# Patient Record
Sex: MALE | Race: OTHER | HISPANIC OR LATINO | URBAN - METROPOLITAN AREA
[De-identification: names, ages, dates, MRNs, and addresses within clinical notes are randomized per-mention and may not be internally consistent; named-entity substitution may affect disease eponyms.]

---

## 2022-04-01 ENCOUNTER — EMERGENCY (EMERGENCY)
Facility: HOSPITAL | Age: 37
LOS: 1 days | Discharge: HOME | End: 2022-04-01
Attending: EMERGENCY MEDICINE
Payer: MEDICAID

## 2022-04-01 VITALS
HEIGHT: 69 IN | SYSTOLIC BLOOD PRESSURE: 135 MMHG | WEIGHT: 223.99 LBS | RESPIRATION RATE: 18 BRPM | TEMPERATURE: 99 F | OXYGEN SATURATION: 98 % | HEART RATE: 92 BPM | DIASTOLIC BLOOD PRESSURE: 91 MMHG

## 2022-04-01 DIAGNOSIS — Z98.84 BARIATRIC SURGERY STATUS: Chronic | ICD-10-CM

## 2022-04-01 PROCEDURE — 99285 EMERGENCY DEPT VISIT HI MDM: CPT

## 2022-04-01 RX ORDER — SODIUM CHLORIDE 9 MG/ML
1000 INJECTION INTRAMUSCULAR; INTRAVENOUS; SUBCUTANEOUS ONCE
Refills: 0 | Status: COMPLETED | OUTPATIENT
Start: 2022-04-01 | End: 2022-04-01

## 2022-04-01 NOTE — ED PROVIDER NOTE - PATIENT PORTAL LINK FT
You can access the FollowMyHealth Patient Portal offered by [facility] by registering at the following website: [email or web address]. By joining [facility]’s FollowMyHealth portal, you will also be able to view your health information using other applications (apps) compatible with our system.

## 2022-04-01 NOTE — ED PROVIDER NOTE - PHYSICAL EXAMINATION
Vital signs noted, see flowsheet.  General: NAD, well appearing and non-toxic.  HEENT: NC/AT. MMM. Conjunctiva and sclera clear b/l.  EOMI. PERRL.  Neck: Soft and supple  Cardiac: RRR. +S1/S2. Peripheral pulses 2+ and symmetric b/l. No LE edema.  Respiratory: Speaking in full sentences, no evidence of respiratory distress. Lungs CTA b/l, no wheezes/rhonchi/rales/stridor.   Abdomen: BSx4. Soft, NTND. No guarding or rebound tenderness. No suprapubic tenderness.  Back: Spine midline and non-tender. No CVAT.  Skin: Abdominal incisions c/d/i, no signs of infection   Lymph: No cervical lymphadenopathy  Neuro: Awake, alert and oriented to person/place/time/situation. Moves all extremities spontaneously and symmetrically.  No focal deficit  Psych: Normal affect

## 2022-04-01 NOTE — ED PROVIDER NOTE - NS ED ATTENDING STATEMENT MOD
This was a shared visit with the SHERMAN. I reviewed and verified the documentation and independently performed the documented:

## 2022-04-01 NOTE — ED PROVIDER NOTE - OBJECTIVE STATEMENT
37 y/o M with PMHX HTN, s/p gastric bypass presents to ED c/o 4-5 episodes of non bloody diarrhea for past 7 days. Pt reports he had gastric bypass 10 days ago at [facility]. Pt called his Surgeon who recommended cutting out dairy from his diet but diarrhea has persisted. Pt has been following liquid diet. 35 y/o M with PMHX HTN, s/p gastric bypass presents to ED c/o 4-5 episodes of non bloody diarrhea for past 7 days. Pt reports he had gastric bypass 10 days ago at [facility]. Pt called his Surgeon who recommended cutting out dairy from his diet but diarrhea has persisted. Pt has been following liquid diet. Pt denies abdominal discomfort at this time.

## 2022-04-01 NOTE — ED ADULT NURSE NOTE - OBJECTIVE STATEMENT
Pt. c/o diarrhea x 7 days, denies N/V/fever/pain.  Pt. had gastric bypass 10 days ago, on liquid diet.  all other systems wnl.

## 2022-04-01 NOTE — ED PROVIDER NOTE - ATTENDING CONTRIBUTION TO CARE
Persistent diarrhea since gastric sleeve but no abdominal pain and benign exam, CT with findings possibly compatible with colitis. No leukocytosis or fever. Nonbloody diarrhea. Volume depletion corrected. Needs close follow up with his bariatric team. Return precautions provided.

## 2022-04-01 NOTE — ED PROVIDER NOTE - CLINICAL SUMMARY MEDICAL DECISION MAKING FREE TEXT BOX
37 y/o M with PMHX HTN, s/p gastric bypass presents to ED c/o 4-5 episodes of non bloody diarrhea for past 7 days. Pt reports he had gastric bypass 10 days ago at [facility]  - Will check labs, CT, give fluids

## 2022-04-01 NOTE — ED PROVIDER NOTE - NSFOLLOWUPINSTRUCTIONS_ED_ALL_ED_FT
- Amelie un seguimiento con robertson médico de atención primaria en 1 o 2 días. Si no puede hacer un seguimiento con robertson médico de atención primaria, regrese al servicio de urgencias por cualquier problema urgente.  - Busque atención médica inmediata ante cualquier signo o síntoma nuevo, que empeore o que le preocupe.  - [place] los medicamentos según las indicaciones, asegúrese de leer todas las instrucciones en el empaque  - Se le entregaron copias de todos los resultados de dandy pruebas, llévelas a robertson médico de atención primaria para que revise los resultados anormales  - Seguimiento con     - Si tiene dificultades para realizar un seguimiento, llame al: [phone] ([id number]) o visite [email or web address] para obtener un médico o especialista de [facility] que acepte robertson seguro en robertson área.    ¡Sentirse mejor!      Diarrea en los adultos    Diarrhea, Adult      La diarrea consiste en deposiciones frecuentes, blandas o acuosas. La diarrea puede hacerlo sentir débil y deshidratarlo. La deshidratación puede causarle cansancio, sed, sequedad en la boca y disminución en la frecuencia con la que orina.    Generalmente, la diarrea dura entre 2 y 3 días. Sin embargo, puede durar más tiempo si se trata de un signo de algo más cady. Es importante tratar la diarrea nisa se lo haya indicado el médico.      Siga estas indicaciones en robertson casa:      Comida y bebida                   Siga estas recomendaciones nisa se lo haya indicado el médico:  •[place] shantal solución de rehidratación oral (oral rehydration solution, ORS). Es un medicamento de venta shireen que ayuda a que el cuerpo recupere el equilibrio normal de nutrientes y agua. Se la encuentra en farmacias y tiendas minoristas.      •Savi abundantes líquidos, nisa agua, trocitos de hielo, jugos de fruta rebajados con agua y bebidas deportivas bajas en calorías. Puede beber leche también, si lo desea.      •Evite consumir líquidos que contengan mucha azúcar o cafeína, nisa bebidas energéticas, bebidas deportivas y refrescos.      •En la medida en que pueda, consuma alimentos blandos y fáciles de digerir en pequeñas cantidades. Estos alimentos incluyen bananas, compota de manzana, arroz, owen magras, tostadas y galletas saladas.      •Evite joel alcohol.      •Evite los alimentos condimentados o con alto contenido de grasa.      Medicamentos     •[place] los medicamentos de venta shireen y los recetados solamente nisa se lo haya indicado el médico.      •Si le recetaron un antibiótico, tómelo nisa se lo haya indicado el médico. No deje de usar el antibiótico aunque comience a sentirse mejor.        Indicaciones generales    •Lávese las vj frecuentemente usando agua y jabón. Use desinfectante para vj si no dispone de agua y jabón. Las demás personas de la casa deben lavarse las vj también. Las vj deben lavarse:  •Después de usar el baño o cambiar un pañal.       •Antes de preparar, cocinar o servir la comida.       •Mientras cuida de shantal persona enferma, o cuando visita a alguien en el hospital.         •Savi suficiente líquido nisa para mantener la orina de color amarillo pálido.      •Descanse en robertson casa mientras se recupera.      •Controle robertson afección para detectar cualquier cambio.      •[place] un baño caliente para ayudar a disminuir el ardor o el dolor causados por los episodios frecuentes de diarrea.      •Concurra a todas las visitas de control nisa se lo haya indicado el médico. [place] es importante.        Comuníquese con un médico si:    •Tiene fiebre.      •La diarrea empeora.      •Aparecen nuevos síntomas.      •No puede retener los líquidos.      •Se siente aturdido o mareado.      •Tiene dolor de yas.      •Tiene calambres musculares.        Solicite ayuda inmediatamente si:    •Siente dolor en el pecho.      •Se siente muy débil o se desmaya.      •Tiene heces con alok, de color charisma, o con aspecto alquitranado.      •Tiene dolor intenso, cólicos o distensión abdominal.      •Tiene problemas para respirar o respira muy rápidamente.      •Robertson corazón late muy rápidamente.      •Siente la piel fría y húmeda.      •Se siente confundido.    •Tiene signos de deshidratación, nisa los siguientes:  •Orina de color oscuro, muy escasa o falta de orina.      •Labios agrietados.      •Sequedad de boca.      •Ojos hundidos.      •Somnolencia.      •Debilidad.          Resumen    •La diarrea consiste en deposiciones frecuentes, blandas o acuosas. La diarrea puede hacerlo sentir débil y deshidratarlo.      •Savi suficiente líquido para mantener la orina de color amarillo pálido.      •Asegúrese de lavarse las vj después de usar el baño. Use desinfectante para vj si no dispone de agua y jabón.      •Comuníquese con un médico si la diarrea empeora o tiene nuevos síntomas.      •Busque ayuda de inmediato si tiene signos de deshidratación.      Esta información no tiene nisa fin reemplazar el consejo del médico. Asegúrese de hacerle al médico cualquier pregunta que tenga. - Amelie un seguimiento con robertson médico de atención primaria en 1 o 2 días. Si no puede hacer un seguimiento con robertson médico de atención primaria, regrese al servicio de urgencias por cualquier problema urgente.  - Busque atención médica inmediata ante cualquier signo o síntoma nuevo, que empeore o que le preocupe.  - Se le entregaron copias de todos los resultados de dandy pruebas, llévelas a robertson médico de atención primaria para que revise los resultados anormales  - llame a robertson cirujano HOY para programar un seguimiento     - Si tiene dificultades para realizar un seguimiento, llame al: [phone] ([id number]) o visite [email or web address] para obtener un médico o especialista de [facility] que acepte robertson seguro en robertson área.    ¡Sentirse mejor!      Diarrea en los adultos    Diarrhea, Adult      La diarrea consiste en deposiciones frecuentes, blandas o acuosas. La diarrea puede hacerlo sentir débil y deshidratarlo. La deshidratación puede causarle cansancio, sed, sequedad en la boca y disminución en la frecuencia con la que orina.    Generalmente, la diarrea dura entre 2 y 3 días. Sin embargo, puede durar más tiempo si se trata de un signo de algo más cady. Es importante tratar la diarrea nisa se lo haya indicado el médico.      Siga estas indicaciones en robertson casa:      Comida y bebida                   Siga estas recomendaciones nisa se lo haya indicado el médico:  •[place] shantal solución de rehidratación oral (oral rehydration solution, ORS). Es un medicamento de venta shireen que ayuda a que el cuerpo recupere el equilibrio normal de nutrientes y agua. Se la encuentra en farmacias y tiendas minoristas.      •Savi abundantes líquidos, nisa agua, trocitos de hielo, jugos de fruta rebajados con agua y bebidas deportivas bajas en calorías. Puede beber leche también, si lo desea.      •Evite consumir líquidos que contengan mucha azúcar o cafeína, nisa bebidas energéticas, bebidas deportivas y refrescos.      •En la medida en que pueda, consuma alimentos blandos y fáciles de digerir en pequeñas cantidades. Estos alimentos incluyen bananas, compota de manzana, arroz, owen magras, tostadas y galletas saladas.      •Evite joel alcohol.      •Evite los alimentos condimentados o con alto contenido de grasa.      Medicamentos     •[place] los medicamentos de venta shireen y los recetados solamente nisa se lo haya indicado el médico.      •Si le recetaron un antibiótico, tómelo nisa se lo haya indicado el médico. No deje de usar el antibiótico aunque comience a sentirse mejor.        Indicaciones generales    •Lávese las vj frecuentemente usando agua y jabón. Use desinfectante para vj si no dispone de agua y jabón. Las demás personas de la casa deben lavarse las vj también. Las vj deben lavarse:  •Después de usar el baño o cambiar un pañal.       •Antes de preparar, cocinar o servir la comida.       •Mientras cuida de shantal persona enferma, o cuando visita a alguien en el hospital.         •Savi suficiente líquido nisa para mantener la orina de color amarillo pálido.      •Descanse en robertson casa mientras se recupera.      •Controle robertson afección para detectar cualquier cambio.      •[place] un baño caliente para ayudar a disminuir el ardor o el dolor causados por los episodios frecuentes de diarrea.      •Concurra a todas las visitas de control nisa se lo haya indicado el médico. [place] es importante.        Comuníquese con un médico si:    •Tiene fiebre.      •La diarrea empeora.      •Aparecen nuevos síntomas.      •No puede retener los líquidos.      •Se siente aturdido o mareado.      •Tiene dolor de yas.      •Tiene calambres musculares.        Solicite ayuda inmediatamente si:    •Siente dolor en el pecho.      •Se siente muy débil o se desmaya.      •Tiene heces con alok, de color charisma, o con aspecto alquitranado.      •Tiene dolor intenso, cólicos o distensión abdominal.      •Tiene problemas para respirar o respira muy rápidamente.      •[place] corazón late muy rápidamente.      •Siente la piel fría y húmeda.      •Se siente confundido.    •Tiene signos de deshidratación, nisa los siguientes:  •Orina de color oscuro, muy escasa o falta de orina.      •Labios agrietados.      •Sequedad de boca.      •Ojos hundidos.      •Somnolencia.      •Debilidad.          Resumen    •La diarrea consiste en deposiciones frecuentes, blandas o acuosas. La diarrea puede hacerlo sentir débil y deshidratarlo.      •Savi suficiente líquido para mantener la orina de color amarillo pálido.      •Asegúrese de lavarse las vj después de usar el baño. Use desinfectante para vj si no dispone de agua y jabón.      •Comuníquese con un médico si la diarrea empeora o tiene nuevos síntomas.      •Busque ayuda de inmediato si tiene signos de deshidratación.      Esta información no tiene nisa fin reemplazar el consejo del médico. Asegúrese de hacerle al médico cualquier pregunta que tenga.

## 2022-04-01 NOTE — ED PROVIDER NOTE - PROGRESS NOTE DETAILS
AQUIELS Harris NOTE: Labs reviewed, pt reassessed, informed of plan for CT scan  ED  [place] AQUILES Harris NOTE: Reviewed all results and plan; pt still denying abdominal pain. Advised to call surgeon in AM. Abd soft NTND no rebound or guarding. Pt stable for d/c, reports improvement, VSS, tolerating PO, ambulatory.  Discussion includes results, plan, and return precautions. Pt advised to f/u with PMD 1-2 days and specialists discussed.  Printed copies of available lab/radiology results contained within discharge packet. Pt verbalized understanding/agreement of plan. Language Line Mason #[id number]

## 2022-04-02 VITALS
TEMPERATURE: 98 F | OXYGEN SATURATION: 99 % | SYSTOLIC BLOOD PRESSURE: 134 MMHG | RESPIRATION RATE: 16 BRPM | HEART RATE: 85 BPM | DIASTOLIC BLOOD PRESSURE: 83 MMHG

## 2022-04-02 LAB
ALBUMIN SERPL ELPH-MCNC: 4.5 G/DL — SIGNIFICANT CHANGE UP (ref 3.3–5.2)
ALP SERPL-CCNC: 78 U/L — SIGNIFICANT CHANGE UP (ref 40–120)
ALT FLD-CCNC: 104 U/L — HIGH
ANION GAP SERPL CALC-SCNC: 21 MMOL/L — HIGH (ref 5–17)
AST SERPL-CCNC: 65 U/L — HIGH
BASOPHILS # BLD AUTO: 0.06 K/UL — SIGNIFICANT CHANGE UP (ref 0–0.2)
BASOPHILS NFR BLD AUTO: 0.7 % — SIGNIFICANT CHANGE UP (ref 0–2)
BILIRUB SERPL-MCNC: 0.5 MG/DL — SIGNIFICANT CHANGE UP (ref 0.4–2)
BUN SERPL-MCNC: 21.7 MG/DL — HIGH (ref 8–20)
CALCIUM SERPL-MCNC: 9.7 MG/DL — SIGNIFICANT CHANGE UP (ref 8.6–10.2)
CHLORIDE SERPL-SCNC: 91 MMOL/L — LOW (ref 98–107)
CO2 SERPL-SCNC: 20 MMOL/L — LOW (ref 22–29)
CREAT SERPL-MCNC: 0.98 MG/DL — SIGNIFICANT CHANGE UP (ref 0.5–1.3)
EGFR: 104 ML/MIN/1.73M2 — SIGNIFICANT CHANGE UP
EOSINOPHIL # BLD AUTO: 0.11 K/UL — SIGNIFICANT CHANGE UP (ref 0–0.5)
EOSINOPHIL NFR BLD AUTO: 1.2 % — SIGNIFICANT CHANGE UP (ref 0–6)
GLUCOSE SERPL-MCNC: 78 MG/DL — SIGNIFICANT CHANGE UP (ref 70–99)
HCT VFR BLD CALC: 43.4 % — SIGNIFICANT CHANGE UP (ref 39–50)
HGB BLD-MCNC: 14.9 G/DL — SIGNIFICANT CHANGE UP (ref 13–17)
IMM GRANULOCYTES NFR BLD AUTO: 0.2 % — SIGNIFICANT CHANGE UP (ref 0–1.5)
LIDOCAIN IGE QN: 78 U/L — HIGH (ref 22–51)
LYMPHOCYTES # BLD AUTO: 3.3 K/UL — SIGNIFICANT CHANGE UP (ref 1–3.3)
LYMPHOCYTES # BLD AUTO: 36.9 % — SIGNIFICANT CHANGE UP (ref 13–44)
MCHC RBC-ENTMCNC: 29.4 PG — SIGNIFICANT CHANGE UP (ref 27–34)
MCHC RBC-ENTMCNC: 34.3 GM/DL — SIGNIFICANT CHANGE UP (ref 32–36)
MCV RBC AUTO: 85.6 FL — SIGNIFICANT CHANGE UP (ref 80–100)
MONOCYTES # BLD AUTO: 0.85 K/UL — SIGNIFICANT CHANGE UP (ref 0–0.9)
MONOCYTES NFR BLD AUTO: 9.5 % — SIGNIFICANT CHANGE UP (ref 2–14)
NEUTROPHILS # BLD AUTO: 4.6 K/UL — SIGNIFICANT CHANGE UP (ref 1.8–7.4)
NEUTROPHILS NFR BLD AUTO: 51.5 % — SIGNIFICANT CHANGE UP (ref 43–77)
PLATELET # BLD AUTO: 337 K/UL — SIGNIFICANT CHANGE UP (ref 150–400)
POTASSIUM SERPL-MCNC: 3.9 MMOL/L — SIGNIFICANT CHANGE UP (ref 3.5–5.3)
POTASSIUM SERPL-SCNC: 3.9 MMOL/L — SIGNIFICANT CHANGE UP (ref 3.5–5.3)
PROT SERPL-MCNC: 8.4 G/DL — SIGNIFICANT CHANGE UP (ref 6.6–8.7)
RBC # BLD: 5.07 M/UL — SIGNIFICANT CHANGE UP (ref 4.2–5.8)
RBC # FLD: 12.1 % — SIGNIFICANT CHANGE UP (ref 10.3–14.5)
SODIUM SERPL-SCNC: 132 MMOL/L — LOW (ref 135–145)
WBC # BLD: 8.94 K/UL — SIGNIFICANT CHANGE UP (ref 3.8–10.5)
WBC # FLD AUTO: 8.94 K/UL — SIGNIFICANT CHANGE UP (ref 3.8–10.5)

## 2022-04-02 PROCEDURE — 36415 COLL VENOUS BLD VENIPUNCTURE: CPT

## 2022-04-02 PROCEDURE — 80053 COMPREHEN METABOLIC PANEL: CPT

## 2022-04-02 PROCEDURE — 83735 ASSAY OF MAGNESIUM: CPT

## 2022-04-02 PROCEDURE — 83690 ASSAY OF LIPASE: CPT

## 2022-04-02 PROCEDURE — 74177 CT ABD & PELVIS W/CONTRAST: CPT | Mod: MA

## 2022-04-02 PROCEDURE — 85025 COMPLETE CBC W/AUTO DIFF WBC: CPT

## 2022-04-02 PROCEDURE — 84100 ASSAY OF PHOSPHORUS: CPT

## 2022-04-02 PROCEDURE — 74177 CT ABD & PELVIS W/CONTRAST: CPT | Mod: 26,MA

## 2022-04-02 PROCEDURE — 99284 EMERGENCY DEPT VISIT MOD MDM: CPT | Mod: 25

## 2022-04-02 RX ORDER — SODIUM CHLORIDE 9 MG/ML
1000 INJECTION, SOLUTION INTRAVENOUS ONCE
Refills: 0 | Status: COMPLETED | OUTPATIENT
Start: 2022-04-02 | End: 2022-04-02

## 2022-04-02 RX ORDER — IOHEXOL 300 MG/ML
30 INJECTION, SOLUTION INTRAVENOUS ONCE
Refills: 0 | Status: COMPLETED | OUTPATIENT
Start: 2022-04-02 | End: 2022-04-02

## 2022-04-02 RX ADMIN — SODIUM CHLORIDE 500 MILLILITER(S): 9 INJECTION, SOLUTION INTRAVENOUS at 01:42

## 2022-04-02 RX ADMIN — IOHEXOL 30 MILLILITER(S): 300 INJECTION, SOLUTION INTRAVENOUS at 00:45

## 2022-04-02 RX ADMIN — SODIUM CHLORIDE 1000 MILLILITER(S): 9 INJECTION INTRAMUSCULAR; INTRAVENOUS; SUBCUTANEOUS at 00:12

## 2022-04-21 ENCOUNTER — EMERGENCY (EMERGENCY)
Facility: HOSPITAL | Age: 37
LOS: 1 days | Discharge: HOME | End: 2022-04-21
Attending: EMERGENCY MEDICINE
Payer: MEDICAID

## 2022-04-21 VITALS
OXYGEN SATURATION: 96 % | DIASTOLIC BLOOD PRESSURE: 76 MMHG | RESPIRATION RATE: 18 BRPM | WEIGHT: 216.05 LBS | SYSTOLIC BLOOD PRESSURE: 114 MMHG | TEMPERATURE: 99 F | HEIGHT: 69 IN | HEART RATE: 88 BPM

## 2022-04-21 VITALS
TEMPERATURE: 98 F | DIASTOLIC BLOOD PRESSURE: 69 MMHG | RESPIRATION RATE: 18 BRPM | OXYGEN SATURATION: 98 % | HEART RATE: 81 BPM | SYSTOLIC BLOOD PRESSURE: 117 MMHG

## 2022-04-21 DIAGNOSIS — Z98.84 BARIATRIC SURGERY STATUS: Chronic | ICD-10-CM

## 2022-04-21 PROBLEM — I10 ESSENTIAL (PRIMARY) HYPERTENSION: Chronic | Status: ACTIVE | Noted: 2022-04-01

## 2022-04-21 LAB
ALBUMIN SERPL ELPH-MCNC: 4.1 G/DL — SIGNIFICANT CHANGE UP (ref 3.3–5.2)
ALP SERPL-CCNC: 84 U/L — SIGNIFICANT CHANGE UP (ref 40–120)
ALT FLD-CCNC: 99 U/L — HIGH
ANION GAP SERPL CALC-SCNC: 13 MMOL/L — SIGNIFICANT CHANGE UP (ref 5–17)
AST SERPL-CCNC: 51 U/L — HIGH
BASOPHILS # BLD AUTO: 0.06 K/UL — SIGNIFICANT CHANGE UP (ref 0–0.2)
BASOPHILS NFR BLD AUTO: 0.9 % — SIGNIFICANT CHANGE UP (ref 0–2)
BILIRUB SERPL-MCNC: 0.5 MG/DL — SIGNIFICANT CHANGE UP (ref 0.4–2)
BUN SERPL-MCNC: 13.5 MG/DL — SIGNIFICANT CHANGE UP (ref 8–20)
CALCIUM SERPL-MCNC: 9.4 MG/DL — SIGNIFICANT CHANGE UP (ref 8.6–10.2)
CHLORIDE SERPL-SCNC: 101 MMOL/L — SIGNIFICANT CHANGE UP (ref 98–107)
CO2 SERPL-SCNC: 25 MMOL/L — SIGNIFICANT CHANGE UP (ref 22–29)
CREAT SERPL-MCNC: 0.74 MG/DL — SIGNIFICANT CHANGE UP (ref 0.5–1.3)
EGFR: 122 ML/MIN/1.73M2 — SIGNIFICANT CHANGE UP
EOSINOPHIL # BLD AUTO: 0.49 K/UL — SIGNIFICANT CHANGE UP (ref 0–0.5)
EOSINOPHIL NFR BLD AUTO: 7.2 % — HIGH (ref 0–6)
GLUCOSE SERPL-MCNC: 88 MG/DL — SIGNIFICANT CHANGE UP (ref 70–99)
HCT VFR BLD CALC: 38.9 % — LOW (ref 39–50)
HGB BLD-MCNC: 12.9 G/DL — LOW (ref 13–17)
IMM GRANULOCYTES NFR BLD AUTO: 0.3 % — SIGNIFICANT CHANGE UP (ref 0–1.5)
LIDOCAIN IGE QN: 69 U/L — HIGH (ref 22–51)
LYMPHOCYTES # BLD AUTO: 2.12 K/UL — SIGNIFICANT CHANGE UP (ref 1–3.3)
LYMPHOCYTES # BLD AUTO: 31.3 % — SIGNIFICANT CHANGE UP (ref 13–44)
MAGNESIUM SERPL-MCNC: 1.9 MG/DL — SIGNIFICANT CHANGE UP (ref 1.6–2.6)
MCHC RBC-ENTMCNC: 28.8 PG — SIGNIFICANT CHANGE UP (ref 27–34)
MCHC RBC-ENTMCNC: 33.2 GM/DL — SIGNIFICANT CHANGE UP (ref 32–36)
MCV RBC AUTO: 86.8 FL — SIGNIFICANT CHANGE UP (ref 80–100)
MONOCYTES # BLD AUTO: 0.64 K/UL — SIGNIFICANT CHANGE UP (ref 0–0.9)
MONOCYTES NFR BLD AUTO: 9.5 % — SIGNIFICANT CHANGE UP (ref 2–14)
NEUTROPHILS # BLD AUTO: 3.44 K/UL — SIGNIFICANT CHANGE UP (ref 1.8–7.4)
NEUTROPHILS NFR BLD AUTO: 50.8 % — SIGNIFICANT CHANGE UP (ref 43–77)
NT-PROBNP SERPL-SCNC: 84 PG/ML — SIGNIFICANT CHANGE UP (ref 0–300)
PLATELET # BLD AUTO: 213 K/UL — SIGNIFICANT CHANGE UP (ref 150–400)
POTASSIUM SERPL-MCNC: 3.9 MMOL/L — SIGNIFICANT CHANGE UP (ref 3.5–5.3)
POTASSIUM SERPL-SCNC: 3.9 MMOL/L — SIGNIFICANT CHANGE UP (ref 3.5–5.3)
PROT SERPL-MCNC: 7.5 G/DL — SIGNIFICANT CHANGE UP (ref 6.6–8.7)
RAPID RVP RESULT: SIGNIFICANT CHANGE UP
RBC # BLD: 4.48 M/UL — SIGNIFICANT CHANGE UP (ref 4.2–5.8)
RBC # FLD: 12.6 % — SIGNIFICANT CHANGE UP (ref 10.3–14.5)
SARS-COV-2 RNA SPEC QL NAA+PROBE: SIGNIFICANT CHANGE UP
SODIUM SERPL-SCNC: 139 MMOL/L — SIGNIFICANT CHANGE UP (ref 135–145)
TROPONIN T SERPL-MCNC: <0.01 NG/ML — SIGNIFICANT CHANGE UP (ref 0–0.06)
TROPONIN T SERPL-MCNC: <0.01 NG/ML — SIGNIFICANT CHANGE UP (ref 0–0.06)
WBC # BLD: 6.77 K/UL — SIGNIFICANT CHANGE UP (ref 3.8–10.5)
WBC # FLD AUTO: 6.77 K/UL — SIGNIFICANT CHANGE UP (ref 3.8–10.5)

## 2022-04-21 PROCEDURE — 0225U NFCT DS DNA&RNA 21 SARSCOV2: CPT

## 2022-04-21 PROCEDURE — 99285 EMERGENCY DEPT VISIT HI MDM: CPT | Mod: 25

## 2022-04-21 PROCEDURE — 93010 ELECTROCARDIOGRAM REPORT: CPT

## 2022-04-21 PROCEDURE — 99285 EMERGENCY DEPT VISIT HI MDM: CPT

## 2022-04-21 PROCEDURE — 71045 X-RAY EXAM CHEST 1 VIEW: CPT | Mod: 26

## 2022-04-21 PROCEDURE — 99236 HOSP IP/OBS SAME DATE HI 85: CPT

## 2022-04-21 PROCEDURE — 85025 COMPLETE CBC W/AUTO DIFF WBC: CPT

## 2022-04-21 PROCEDURE — G0378: CPT

## 2022-04-21 PROCEDURE — 71045 X-RAY EXAM CHEST 1 VIEW: CPT

## 2022-04-21 PROCEDURE — 75574 CT ANGIO HRT W/3D IMAGE: CPT | Mod: MA

## 2022-04-21 PROCEDURE — 93005 ELECTROCARDIOGRAM TRACING: CPT

## 2022-04-21 PROCEDURE — 83880 ASSAY OF NATRIURETIC PEPTIDE: CPT

## 2022-04-21 PROCEDURE — 36415 COLL VENOUS BLD VENIPUNCTURE: CPT

## 2022-04-21 PROCEDURE — 84484 ASSAY OF TROPONIN QUANT: CPT

## 2022-04-21 PROCEDURE — 75574 CT ANGIO HRT W/3D IMAGE: CPT | Mod: 26,MA

## 2022-04-21 PROCEDURE — 83690 ASSAY OF LIPASE: CPT

## 2022-04-21 PROCEDURE — 80053 COMPREHEN METABOLIC PANEL: CPT

## 2022-04-21 PROCEDURE — 93306 TTE W/DOPPLER COMPLETE: CPT

## 2022-04-21 PROCEDURE — 83735 ASSAY OF MAGNESIUM: CPT

## 2022-04-21 RX ORDER — METOPROLOL TARTRATE 50 MG
100 TABLET ORAL ONCE
Refills: 0 | Status: COMPLETED | OUTPATIENT
Start: 2022-04-21 | End: 2022-04-21

## 2022-04-21 RX ORDER — NEBIVOLOL HYDROCHLORIDE 5 MG/1
5 TABLET ORAL DAILY
Refills: 0 | Status: DISCONTINUED | OUTPATIENT
Start: 2022-04-21 | End: 2022-04-25

## 2022-04-21 RX ORDER — ASPIRIN/CALCIUM CARB/MAGNESIUM 324 MG
324 TABLET ORAL ONCE
Refills: 0 | Status: COMPLETED | OUTPATIENT
Start: 2022-04-21 | End: 2022-04-21

## 2022-04-21 RX ORDER — HYDROCHLOROTHIAZIDE 25 MG
12.5 TABLET ORAL DAILY
Refills: 0 | Status: DISCONTINUED | OUTPATIENT
Start: 2022-04-21 | End: 2022-04-21

## 2022-04-21 RX ORDER — LISINOPRIL 2.5 MG/1
10 TABLET ORAL DAILY
Refills: 0 | Status: DISCONTINUED | OUTPATIENT
Start: 2022-04-21 | End: 2022-04-25

## 2022-04-21 RX ADMIN — Medication 100 MILLIGRAM(S): at 13:33

## 2022-04-21 RX ADMIN — Medication 324 MILLIGRAM(S): at 11:53

## 2022-04-21 NOTE — ED CDU PROVIDER INITIAL DAY NOTE - OBJECTIVE STATEMENT
Pt is a 36y M with PMh HTN, gastric bypass surgery at [facility] last month, HTN, HLD presenting with CP. Patient states he developed 5/10 dull constant L sided CP this morning at 6 am lasting 15 minutes; Currently asymptomatic. Denies SOB, dizziness, lightheadedness, syncope, radiation down his arms or up his jaw, previous cardiac history. Patient's mother passed away from an MI at age 62. Patient last saw a cardiologist 3 years ago. He has had congestion and sneezing for 5 days. Denies any stents in the past. Denies fevers, chills, dizziness, lightheadedness, dysphagia, dysarthria, diplopia, photophobia, syncope, cough, SOB, abdominal pain, neck pain, back pain, diarrhea, dysuria, hematuria, hematochezia, hematemesis, n/v, recent travel, sick contacts, leg swelling.

## 2022-04-21 NOTE — CONSULT NOTE ADULT - ASSESSMENT
37 y/o M with PMH of HTN, Dyslipidemia and Gastric bypass surgery (1 month ago) presents with complaints of acute onset of chest pain and abnormal EKG.    1) Chest pain with abnormal EKG   - patient notes alleviation of chest pain at rest  - EKG reviewed and notes to have inferior lateral twave inversions no baseline to compare   - Trop x 1 negative and recommend to trend   - will refer for TTE to assess LV function and valvulopathy   - will refer for Cardiac CTA in the AM to assess coronary anatomy and any coronary obstruction   - obtain Lipid panel Hgb A1C and TSH     2) HTN   - blood pressure controlled   - PMD recommended to stop HCTz and continue with Lisinopril and Nebivolol   - continue with blood pressure monitoring      Laine Tran D.O. [facility]  Cardiology/Vascular Cardiology -[facility]   Telephone # [phone]

## 2022-04-21 NOTE — ED PROVIDER NOTE - PHYSICAL EXAMINATION
Constitutional: Well appearing, awake, alert, oriented to person, place, and time/situation and in no apparent distress  ENMT: Airway patent nasal mucosa clear. Mouth with normal mucosa. Throat has no vesicles no oropharyngeal exudates and uvula is midline. No blood in the oropharynx  EYES: clear bilaterally, pupils equal, round and reactive to light  Cardiac: Regular rate, regular rhythm. Heart sounds S1, S2. No murmurs, rubs or gallops. Good capillary refill, 2+ pulses, no peripheral edema, chest wall non-tender  Respiratory: Lungs CTAB, no use of accessory muscles, no crackles, satting 99% on RA in no distress  Gastrointestinal: Abdomen nondistended, non-tender, no rebound guarding or peritoneal signs  Genitourinary: No CVA tenderness, pelvis nontender, bladder nondistended  Musculoskeletal: Spine appears normal, range of motion is not limited, no muscle or joint tenderness  Neurological: Alert and oriented, no focal deficits, no motor or sensory deficits. CN 2-12 intact, PERRLA, EOMI, No FND, moving all extremities equally, sensation intact, No dysmetria, no ataxia, negative heel-shin, 5/5 strength   Skin: Skin normal color for race, warm, dry and intact, No evidence of rash

## 2022-04-21 NOTE — CONSULT NOTE ADULT - SUBJECTIVE AND OBJECTIVE BOX
[facility] [facility]                                                               Office:  [address] [address]                                                              Telephone: [phone]. Fax:[phone]                                                                        CARDIOLOGY CONSULTATION NOTE                                                                                             Consult requested by:  Chest Pain and abnromal EKG   Reason for Consultation: Dr. Lozoya   History obtained by: Patient and medical record   obtained: No    Chief complaint:    Patient is a 36y old  Male who presents with a chief complaint of       HPI: 35 y/o M with PMH of HTN, Dyslipidemia and Gastric bypass surgery (1 month ago) presents with complaints of acute onset of chest pain and abnormal EKG. Patient notes that he was usual health until this morning had acute onset of chest pain left sided sharp pain, which was 7-8 /10 lasting for 15 minutes and then went away on its own no recurrence, some shortness of breath and no palpitations lightheadedness or syncope. He notes prior to bariatric surgery no cardiovascular work up and had EKG told it was normal and was cleared from that standpoint.   Patient also has been having low bp and recently PMD stopped HCTZ and to continue with Nebivolol and Lisinopril         REVIEW OF SYMPTOMS:     CONSTITUTIONAL: No fever, weight loss, or fatigue  ENMT:  No difficulty hearing, tinnitus, vertigo; No sinus or throat pain  NECK: No pain or stiffness  CARDIOVASCULAR: +chest pain, dyspnea, syncope, palpitations, dizziness, Orthopnea, Paroxsymal nocturnal dyspnea  RESPIRATORY: No Dyspnea on exertion, Shortness of breath, cough, wheezing  : No dysuria, no hematuria   GI: No dark color stool, no melena, no diarrhea, no constipation, no abdominal pain   NEURO: No headache, no dizziness, no slurred speech   MUSCULOSKELETAL: No joint pain or swelling; No muscle, back, or extremity pain  PSYCH: No agitation, no anxiety.    ALL OTHER REVIEW OF SYSTEMS ARE NEGATIVE.      PREVIOUS DIAGNOSTIC TESTING  ECHO FINDINGS:      STRESS FINDINGS:      CATHETERIZATION FINDINGS:         ALLERGIES: Allergies    No Known Allergies    Intolerances          PAST MEDICAL HISTORY  HTN (hypertension)        PAST SURGICAL HISTORY  S/P gastric bypass        FAMILY HISTORY:  FH: myocardial infarction (Mother)        SOCIAL HISTORY:  Denies smoking/alcohol/drugs  CIGARETTES:     ALCOHOL:  DRUGS:      CURRENT MEDICATIONS:  hydrochlorothiazide 12.5 milliGRAM(s) Oral daily  lisinopril 10 milliGRAM(s) Oral daily  nebivolol 5 milliGRAM(s) Oral daily           HOME MEDICATIONS:      Vital Signs Last 24 Hrs  T(C): 36.6 (21 Apr 2022 15:55), Max: 37.2 (21 Apr 2022 09:32)  T(F): 97.9 (21 Apr 2022 15:55), Max: 98.9 (21 Apr 2022 09:32)  HR: 81 (21 Apr 2022 15:55) (81 - 88)  BP: 117/69 (21 Apr 2022 15:55) (114/76 - 120/68)  BP(mean): --  RR: 18 (21 Apr 2022 15:55) (18 - 18)  SpO2: 98% (21 Apr 2022 15:55) (96% - 98%)      PHYSICAL EXAM:  Constitutional: Comfortable . No acute distress.   HEENT: Atraumatic and normocephalic , neck is supple . no JVD. No carotid bruit. PEERL   CNS: A&Ox3. No focal deficits. EOMI. Cranial nerves II-IX are intact.   Lymph Nodes: Cervical : Not palpable.  Respiratory: CTAB  Cardiovascular: S1S2 RRR. No murmur/rubs or gallop.  Gastrointestinal: Soft non-tender and non distended . +Bowel sounds. negative Salgado's sign.  Extremities: No edema.   Psychiatric: Calm . no agitation.  Skin: No skin rash/ulcers visualized to face, hands or feet.    Intake and output:     LABS:                        12.9   6.77  )-----------( 213      ( 21 Apr 2022 10:54 )             38.9     04-21    139  |  101  |  13.5  ----------------------------<  88  3.9   |  25.0  |  0.74    Ca    9.4      21 Apr 2022 10:54  Mg     1.9     04-21    TPro  7.5  /  Alb  4.1  /  TBili  0.5  /  DBili  x   /  AST  51<H>  /  ALT  99<H>  /  AlkPhos  84  04-21    CARDIAC MARKERS ( 21 Apr 2022 10:54 )  x     / <0.01 ng/mL / x     / x     / x        ;p-BNP=Serum Pro-Brain Natriuretic Peptide: 84 pg/mL (04-21 @ 10:54)          INTERPRETATION OF TELEMETRY: Reviewed by me. sinus rhythm   ECG: Reviewed by me. NSR with inferior lateral T wave inversions     RADIOLOGY & ADDITIONAL STUDIES:    X-ray:  reviewed by me.   CT scan:   MRI:

## 2022-04-21 NOTE — ED PROVIDER NOTE - ATTENDING CONTRIBUTION TO CARE
Dr. Chao : I have personally seen and examined this patient at the bedside. I have fully participated in the care of this patient. I have reviewed all pertinent clinical information, including history, physical exam, plan and the Resident's note and agree except as noted.     35yo M with PMhx HTN, gastric bypass surgery at [facility] last month, HTN, HLD pw cp since this morning around 6am.  non radiating no numbness/tingling. Patient's mother passed away from an MI at age 62.     Denies f/c/n/v/cp/sob/palpitations/cough/abd.pain/d/c/dysuria/hematuria. sick contacts/recent travel.    PE:  head; atraumatic normocephalic  eyes: perrla  Heart: rrr s1s2  lungs: ctab  abd: soft, nt nd + bs no rebound/guarding no cva ttp  le: no swelling no calf ttp  back: no midline cervical/thoracic/lumbar ttp      -->new t wave inversions on ekg will check trop ct coronary cardiology consult--obs

## 2022-04-21 NOTE — ED ADULT NURSE NOTE - SKIN TEMPERATURE MOISTURE
2nd Attempted to call report to receiving [facility] RN.  No answer from RN's Phone warm

## 2022-04-21 NOTE — ED CDU PROVIDER DISPOSITION NOTE - CLINICAL COURSE
Pt is a 36y M with PMH HTN, gastric bypass surgery at [facility] last month, HTN, HLD presenting with CP. Patient states he developed 5/10 dull constant L sided CP this morning at 6 am lasting 15 minutes; Currently asymptomatic. Denies SOB, dizziness, lightheadedness, syncope, radiation down his arms or up his jaw, previous cardiac history. Patient's mother passed away from an MI at age 62. Patient last saw a cardiologist 3 years ago. He has had congestion and sneezing for 5 days. Denies any stents in the past. Denies fevers, chills, dizziness, lightheadedness, dysphagia, dysarthria, diplopia, photophobia, syncope, cough, SOB, abdominal pain, neck pain, back pain, diarrhea, dysuria, hematuria, hematochezia, hematemesis, n/v, recent travel, sick contacts, leg swelling. Pt was seen by cardiology and recommends CCTA and ECHO. Results reviewed with cardiology and cleared. Will dc with f/u and return precautions.

## 2022-04-21 NOTE — ED CDU PROVIDER INITIAL DAY NOTE - MEDICAL DECISION MAKING DETAILS
Patient is a 36y M with HTN, gastric bypass surgery at [facility] last month, HTN, HLD presenting for chest pain this am. Pt found to have new EKG t wave inversions. Cardiology consulted and placed in obs for further work up.

## 2022-04-21 NOTE — ED CDU PROVIDER DISPOSITION NOTE - NSFOLLOWUPCLINICS_GEN_ALL_ED_FT
[facility]iology  Cardiology  [address], [address]  [place] [address]  Phone: [phone]  Fax:

## 2022-04-21 NOTE — ED PROVIDER NOTE - OBJECTIVE STATEMENT
37yo M hx of htn ran out of meds x 2 weeks pw cp/sob Patient is a 35 yo male with PMhx HTN, gastric bypass surgery at [facility] last month, HTN, HLD presenting with CP. Patient states he developed 5/10 dull constant epigastric CP this morning at 6 am lasting 15 minutes; Currently asymptomatic. Denies SOB, dizziness, lightheadedness, syncope, radiation down his arms or up his jaw, previous cardiac history. Patient's mother passed away from an MI at age 62. Patient last saw a cardiologist 3 years ago. He has had congestion and sneezing for 5 days. Denies any stents in the past. Denies fevers, chills, dizziness, lightheadedness, dysphagia, dysarthria, diplopia, photophobia, syncope, cough, SOB, abdominal pain, neck pain, back pain, diarrhea, dysuria, hematuria, hematochezia, hematemesis, n/v, recent travel, sick contacts, leg swelling.

## 2022-04-21 NOTE — ED CDU PROVIDER DISPOSITION NOTE - ATTENDING APP SHARED VISIT CONTRIBUTION OF CARE
I agree with the PA's note and was available for any issues/concerns. I was directly involved in patient care. My brief overall assessment is as follows: cp, w/u as noted, return precautions.

## 2022-04-21 NOTE — ED CDU PROVIDER DISPOSITION NOTE - NSFOLLOWUPINSTRUCTIONS_ED_ALL_ED_FT
Follow up with cardiology.  Come back with new or worsening symptoms.  Chest Pain    Chest pain can be caused by many different conditions which may or may not be dangerous. Causes include heartburn, lung infections, heart attack, blood clot in lungs, skin infections, strain or damage to muscle, cartilage, or bones, etc. In addition to a history and physical examination, an electrocardiogram (ECG) or other lab tests may have been performed to determine the cause of your chest pain. Follow up with your primary care provider or with a cardiologist as instructed.     SEEK IMMEDIATE MEDICAL CARE IF YOU HAVE ANY OF THE FOLLOWING SYMPTOMS: worsening chest pain, coughing up blood, unexplained back/neck/jaw pain, severe abdominal pain, dizziness or lightheadedness, fainting, shortness of breath, sweaty or clammy skin, vomiting, or racing heart beat. These symptoms may represent a serious problem that is an emergency. Do not wait to see if the symptoms will go away. Get medical help right away. Call 911 and do not drive yourself to the hospital.

## 2022-04-21 NOTE — ED PROVIDER NOTE - CLINICAL SUMMARY MEDICAL DECISION MAKING FREE TEXT BOX
Patient is a 35 yo male with PMhx HTN, gastric bypass surgery at [facility] last month, HTN, HLD presenting with CP. Patient states he developed 5/10 dull constant epigastric CP this morning at 6 am lasting 15 minutes; Currently asymptomatic. Denies SOB, dizziness, lightheadedness, syncope, radiation down his arms or up his jaw, previous cardiac history. Patient's mother passed away from an MI at age 62. Patient last saw a cardiologist 3 years ago. VSS, resting comfortably, lungs CTAB, 2+ equal peripheral pulses, no peripheral edema. ASA given. ECG showing new T wave inversions in the inferior leads. Troponin to r/o ACS. cbc, cmp mg, lipase to r/o electrolyte abnormalities. CT angio cardiac to r/o CAD; CXR, BNP RVP to r/o HF vs. PNA vs. URI. Cardiology consulted. Will continue to monitor.

## 2022-04-21 NOTE — ED CDU PROVIDER INITIAL DAY NOTE - NSICDXFAMILYHX_GEN_ALL_CORE_FT
FAMILY HISTORY:  Mother  Still living? Unknown  FH: myocardial infarction, Age at diagnosis: Age Unknown

## 2023-06-22 ENCOUNTER — APPOINTMENT (OUTPATIENT)
Dept: FAMILY MEDICINE | Facility: CLINIC | Age: 38
End: 2023-06-22
Payer: MEDICAID

## 2023-06-22 VITALS
HEIGHT: 69 IN | WEIGHT: 199 LBS | HEART RATE: 72 BPM | SYSTOLIC BLOOD PRESSURE: 155 MMHG | RESPIRATION RATE: 16 BRPM | DIASTOLIC BLOOD PRESSURE: 95 MMHG | BODY MASS INDEX: 29.47 KG/M2

## 2023-06-22 DIAGNOSIS — Z11.4 ENCOUNTER FOR SCREENING FOR HUMAN IMMUNODEFICIENCY VIRUS [HIV]: ICD-10-CM

## 2023-06-22 DIAGNOSIS — Z82.49 FAMILY HISTORY OF ISCHEMIC HEART DISEASE AND OTHER DISEASES OF THE CIRCULATORY SYSTEM: ICD-10-CM

## 2023-06-22 DIAGNOSIS — R22.42 LOCALIZED SWELLING, MASS AND LUMP, LEFT LOWER LIMB: ICD-10-CM

## 2023-06-22 DIAGNOSIS — Z83.3 FAMILY HISTORY OF DIABETES MELLITUS: ICD-10-CM

## 2023-06-22 DIAGNOSIS — Z23 ENCOUNTER FOR IMMUNIZATION: ICD-10-CM

## 2023-06-22 DIAGNOSIS — Z00.00 ENCOUNTER FOR GENERAL ADULT MEDICAL EXAMINATION W/OUT ABNORMAL FINDINGS: ICD-10-CM

## 2023-06-22 DIAGNOSIS — Z11.59 ENCOUNTER FOR SCREENING FOR OTHER VIRAL DISEASES: ICD-10-CM

## 2023-06-22 PROCEDURE — 99203 OFFICE O/P NEW LOW 30 MIN: CPT | Mod: 25

## 2023-06-22 PROCEDURE — 99385 PREV VISIT NEW AGE 18-39: CPT | Mod: 25

## 2023-06-22 RX ORDER — CETIRIZINE HYDROCHLORIDE 10 MG/1
10 TABLET, COATED ORAL
Refills: 0 | Status: ACTIVE | COMMUNITY

## 2023-06-22 NOTE — HEALTH RISK ASSESSMENT
[Yes] : Yes [2 - 4 times a month (2 pts)] : 2-4 times a month (2 points) [5 or 6 (2 pts)] : 5 or 6 (2  points) [Less than monthly (1 pt)] : Less than monthly (1 point) [No] : In the past 12 months have you used drugs other than those required for medical reasons? No [0] : 2) Feeling down, depressed, or hopeless: Not at all (0) [PHQ-2 Negative - No further assessment needed] : PHQ-2 Negative - No further assessment needed [HIV Test offered] : HIV Test offered [Hepatitis C test offered] : Hepatitis C test offered [Never] : Never [Audit-CScore] : 5 [[id number]] : 0

## 2023-06-22 NOTE — PHYSICAL EXAM
[No Acute Distress] : no acute distress [Well Nourished] : well nourished [Well Developed] : well developed [Well-Appearing] : well-appearing [Normal Voice/Communication] : normal voice/communication [Normal Sclera/Conjunctiva] : normal sclera/conjunctiva [PERRL] : pupils equal round and reactive to light [EOMI] : extraocular movements intact [Normal Outer Ear/Nose] : the outer ears and nose were normal in appearance [Normal Oropharynx] : the oropharynx was normal [Normal TMs] : both tympanic membranes were normal [Normal Nasal Mucosa] : the nasal mucosa was normal [No JVD] : no jugular venous distention [No Lymphadenopathy] : no lymphadenopathy [Supple] : supple [Thyroid Normal, No Nodules] : the thyroid was normal and there were no nodules present [No Respiratory Distress] : no respiratory distress  [No Accessory Muscle Use] : no accessory muscle use [Clear to Auscultation] : lungs were clear to auscultation bilaterally [Normal Rate] : normal rate  [Regular Rhythm] : with a regular rhythm [Normal S1, S2] : normal S1 and S2 [No Murmur] : no murmur heard [No Carotid Bruits] : no carotid bruits [No Abdominal Bruit] : a ~M bruit was not heard ~T in the abdomen [No Varicosities] : no varicosities [Pedal Pulses Present] : the pedal pulses are present [No Edema] : there was no peripheral edema [No Palpable Aorta] : no palpable aorta [No Extremity Clubbing/Cyanosis] : no extremity clubbing/cyanosis [Soft] : abdomen soft [Non Tender] : non-tender [Non-distended] : non-distended [No Masses] : no abdominal mass palpated [No HSM] : no HSM [Normal Bowel Sounds] : normal bowel sounds [Normal Posterior Cervical Nodes] : no posterior cervical lymphadenopathy [Normal Anterior Cervical Nodes] : no anterior cervical lymphadenopathy [No CVA Tenderness] : no CVA  tenderness [No Spinal Tenderness] : no spinal tenderness [No Joint Swelling] : no joint swelling [Grossly Normal Strength/Tone] : grossly normal strength/tone [No Rash] : no rash [Coordination Grossly Intact] : coordination grossly intact [No Focal Deficits] : no focal deficits [Normal Gait] : normal gait [Deep Tendon Reflexes (DTR)] : deep tendon reflexes were 2+ and symmetric [Speech Grossly Normal] : speech grossly normal [Normal Affect] : the affect was normal [Normal Mood] : the mood was normal [Normal Insight/Judgement] : insight and judgment were intact [de-identified] : left popliteal fossae with 2x3 cm subcutaneous lump. no other lesions, drainage noted. minimal tenderness

## 2023-06-22 NOTE — HISTORY OF PRESENT ILLNESS
[FreeTextEntry1] : Establish care [de-identified] : Mr. REBECA ARROYO is a pleasant 37 year old male with PMH of HTN, HLD who comes in to the office to establish care and for his AWE. He needs refills on his lisinopril. He did not take it today. Denies any complaints. Reports that his blood pressures at home are consistently < 140/90\par Reports a lump in his left popliteal fossae which has grown minimally, is non painful but can be tender. No fevers, night sweat, weight loss.\par \par Previous PCP: ALEC Langford\par Cardio: ?

## 2023-06-22 NOTE — PLAN
[FreeTextEntry1] : \par In regards annual physical exam: \par Advised to use sunscreen \par Tdap vaccine offered, patient will get it at a later time\par Ordering CBC, CMP, Hep C, HIV as per screening guidelines\par Depression screen: PHQ-2 test done, < 2 as noted above\par AUDIT-C test done, negative as noted above\par Advised to see optometrist once at year and dentist every 6 months\par \par In regards to hypertension\par Patient's blood pressure elevated today\par DIscussed avoid using salt, monitoring his blood pressures at home and bring the log on next visit.\par Renewing his lisinopril 10 mg QD\par \par Obesity s/p bariatric surgery and In regards to hyperlipidemia:\par Lifestyle modifications discussed\par Checking lipid panel\par Continue statins \par  \par Lump of left popliteal fossae\par baker cyst vs lipoma vs other\par check an US\par \par Return to care: within 2 weeks for blood pressure follow up or sooner should the need arise\par Call or return for any questions

## 2023-06-23 LAB
ALBUMIN SERPL ELPH-MCNC: 4.9 G/DL
ALP BLD-CCNC: 101 U/L
ALT SERPL-CCNC: 15 U/L
ANION GAP SERPL CALC-SCNC: 12 MMOL/L
AST SERPL-CCNC: 27 U/L
BILIRUB SERPL-MCNC: 0.8 MG/DL
BUN SERPL-MCNC: 11 MG/DL
CALCIUM SERPL-MCNC: 10.1 MG/DL
CHLORIDE SERPL-SCNC: 101 MMOL/L
CHOLEST SERPL-MCNC: 243 MG/DL
CO2 SERPL-SCNC: 28 MMOL/L
CREAT SERPL-MCNC: 0.8 MG/DL
EGFR: 118 ML/MIN/1.73M2
GLUCOSE SERPL-MCNC: 85 MG/DL
HCV AB SER QL: NONREACTIVE
HCV S/CO RATIO: 0.08 S/CO
HDLC SERPL-MCNC: 76 MG/DL
HIV1+2 AB SPEC QL IA.RAPID: NONREACTIVE
LDLC SERPL CALC-MCNC: 146 MG/DL
NONHDLC SERPL-MCNC: 166 MG/DL
POTASSIUM SERPL-SCNC: 4.3 MMOL/L
PROT SERPL-MCNC: 7.9 G/DL
SODIUM SERPL-SCNC: 140 MMOL/L
TRIGL SERPL-MCNC: 100 MG/DL

## 2023-07-06 ENCOUNTER — APPOINTMENT (OUTPATIENT)
Dept: FAMILY MEDICINE | Facility: CLINIC | Age: 38
End: 2023-07-06

## 2023-10-13 ENCOUNTER — RX RENEWAL (OUTPATIENT)
Age: 38
End: 2023-10-13

## 2023-11-06 ENCOUNTER — RX CHANGE (OUTPATIENT)
Age: 38
End: 2023-11-06

## 2023-11-06 RX ORDER — LISINOPRIL 10 MG/1
10 TABLET ORAL
Qty: 30 | Refills: 0 | Status: DISCONTINUED | COMMUNITY
Start: 2023-10-13 | End: 2023-11-06

## 2023-12-12 ENCOUNTER — RX RENEWAL (OUTPATIENT)
Age: 38
End: 2023-12-12

## 2023-12-29 ENCOUNTER — APPOINTMENT (OUTPATIENT)
Dept: INTERNAL MEDICINE | Facility: CLINIC | Age: 38
End: 2023-12-29
Payer: MEDICAID

## 2023-12-29 VITALS
WEIGHT: 199 LBS | HEART RATE: 86 BPM | DIASTOLIC BLOOD PRESSURE: 81 MMHG | BODY MASS INDEX: 29.47 KG/M2 | HEIGHT: 69 IN | SYSTOLIC BLOOD PRESSURE: 128 MMHG

## 2023-12-29 DIAGNOSIS — E78.5 HYPERLIPIDEMIA, UNSPECIFIED: ICD-10-CM

## 2023-12-29 DIAGNOSIS — I10 ESSENTIAL (PRIMARY) HYPERTENSION: ICD-10-CM

## 2023-12-29 PROCEDURE — 99214 OFFICE O/P EST MOD 30 MIN: CPT | Mod: 25

## 2023-12-29 NOTE — HISTORY OF PRESENT ILLNESS
[FreeTextEntry1] : follow up [de-identified] : Mr. REBECA ARROYO is a 38 year male with a PMH of HTN comes to the office for BP check and follow up of chronic medical conditions. Patient denies fever, cough SOB. No other complaints at this time.

## 2023-12-29 NOTE — PLAN
[FreeTextEntry1] : HLD- lifestyle modifications recommended will retest lipids today  HTN- patient's BP well controlled with current medication. will continue Lisinopril 10 mg PO QD   Prior to appointment and during encounter with patient extensive medical records were reviewed including but not limited to, Hospital records, out patient records, laboratory data and microbiology data In addition extensive time was also spent in reviewing diagnostic studies.   Total encounter total time 30 mins >50% of time spent counseling/coordinating care  Counseling included abnormal lab results, differential diagnoses, treatment options, risks and benefits, lifestyle changes, current condition, medications, and dose adjustments.  The patient was interactive, attentive, asked questions, and verbalized understanding

## 2023-12-29 NOTE — HEALTH RISK ASSESSMENT
[0] : 2) Feeling down, depressed, or hopeless: Not at all (0) [PHQ-2 Negative - No further assessment needed] : PHQ-2 Negative - No further assessment needed [[id number]] : 0 [Never] : Never

## 2024-01-02 LAB
ALBUMIN SERPL ELPH-MCNC: 5 G/DL
ALP BLD-CCNC: 88 U/L
ALT SERPL-CCNC: 17 U/L
ANION GAP SERPL CALC-SCNC: 14 MMOL/L
AST SERPL-CCNC: 24 U/L
BASOPHILS # BLD AUTO: 0.06 K/UL
BASOPHILS NFR BLD AUTO: 0.9 %
BILIRUB SERPL-MCNC: 0.6 MG/DL
BUN SERPL-MCNC: 15 MG/DL
CALCIUM SERPL-MCNC: 10.2 MG/DL
CHLORIDE SERPL-SCNC: 100 MMOL/L
CHOLEST SERPL-MCNC: 251 MG/DL
CO2 SERPL-SCNC: 27 MMOL/L
CREAT SERPL-MCNC: 0.84 MG/DL
EGFR: 116 ML/MIN/1.73M2
EOSINOPHIL # BLD AUTO: 0.17 K/UL
EOSINOPHIL NFR BLD AUTO: 2.6 %
GLUCOSE SERPL-MCNC: 86 MG/DL
HCT VFR BLD CALC: 46.6 %
HDLC SERPL-MCNC: 72 MG/DL
HGB BLD-MCNC: 15.2 G/DL
IMM GRANULOCYTES NFR BLD AUTO: 0.3 %
LDLC SERPL CALC-MCNC: 158 MG/DL
LYMPHOCYTES # BLD AUTO: 2.48 K/UL
LYMPHOCYTES NFR BLD AUTO: 38.2 %
MAN DIFF?: NORMAL
MCHC RBC-ENTMCNC: 29.9 PG
MCHC RBC-ENTMCNC: 32.6 GM/DL
MCV RBC AUTO: 91.6 FL
MONOCYTES # BLD AUTO: 0.43 K/UL
MONOCYTES NFR BLD AUTO: 6.6 %
NEUTROPHILS # BLD AUTO: 3.33 K/UL
NEUTROPHILS NFR BLD AUTO: 51.4 %
NONHDLC SERPL-MCNC: 179 MG/DL
PLATELET # BLD AUTO: 286 K/UL
POTASSIUM SERPL-SCNC: 4.4 MMOL/L
PROT SERPL-MCNC: 8.1 G/DL
RBC # BLD: 5.09 M/UL
RBC # FLD: 12.1 %
SODIUM SERPL-SCNC: 140 MMOL/L
TRIGL SERPL-MCNC: 123 MG/DL
WBC # FLD AUTO: 6.49 K/UL

## 2024-01-02 RX ORDER — ROSUVASTATIN CALCIUM 5 MG/1
5 TABLET, FILM COATED ORAL DAILY
Qty: 90 | Refills: 3 | Status: ACTIVE | COMMUNITY
Start: 2024-01-02 | End: 1900-01-01

## 2024-01-02 RX ORDER — IBUPROFEN 600 MG/1
600 TABLET, FILM COATED ORAL
Qty: 20 | Refills: 0 | Status: COMPLETED | COMMUNITY
Start: 2023-12-21

## 2024-01-02 RX ORDER — AMOXICILLIN 500 MG/1
500 CAPSULE ORAL
Qty: 21 | Refills: 0 | Status: COMPLETED | COMMUNITY
Start: 2023-12-21

## 2024-04-04 ENCOUNTER — RX RENEWAL (OUTPATIENT)
Age: 39
End: 2024-04-04

## 2024-04-18 RX ORDER — LISINOPRIL 10 MG/1
10 TABLET ORAL
Qty: 90 | Refills: 0 | Status: ACTIVE | COMMUNITY
Start: 2023-11-06 | End: 1900-01-01

## 2024-06-24 ENCOUNTER — APPOINTMENT (OUTPATIENT)
Dept: FAMILY MEDICINE | Facility: CLINIC | Age: 39
End: 2024-06-24

## 2024-11-21 ENCOUNTER — APPOINTMENT (OUTPATIENT)
Dept: CARDIOLOGY | Facility: CLINIC | Age: 39
End: 2024-11-21
Payer: COMMERCIAL

## 2024-11-21 ENCOUNTER — NON-APPOINTMENT (OUTPATIENT)
Age: 39
End: 2024-11-21

## 2024-11-21 VITALS
HEIGHT: 69 IN | DIASTOLIC BLOOD PRESSURE: 74 MMHG | BODY MASS INDEX: 28.73 KG/M2 | WEIGHT: 194 LBS | SYSTOLIC BLOOD PRESSURE: 112 MMHG | HEART RATE: 71 BPM

## 2024-11-21 DIAGNOSIS — E78.5 HYPERLIPIDEMIA, UNSPECIFIED: ICD-10-CM

## 2024-11-21 DIAGNOSIS — R94.31 ABNORMAL ELECTROCARDIOGRAM [ECG] [EKG]: ICD-10-CM

## 2024-11-21 DIAGNOSIS — I10 ESSENTIAL (PRIMARY) HYPERTENSION: ICD-10-CM

## 2024-11-21 PROCEDURE — 99214 OFFICE O/P EST MOD 30 MIN: CPT

## 2024-11-21 PROCEDURE — G2211 COMPLEX E/M VISIT ADD ON: CPT | Mod: NC

## 2024-11-21 PROCEDURE — 93000 ELECTROCARDIOGRAM COMPLETE: CPT

## 2024-11-26 ENCOUNTER — APPOINTMENT (OUTPATIENT)
Dept: CARDIOLOGY | Facility: CLINIC | Age: 39
End: 2024-11-26
Payer: COMMERCIAL

## 2024-11-26 PROCEDURE — 93015 CV STRESS TEST SUPVJ I&R: CPT

## 2024-11-26 PROCEDURE — 93306 TTE W/DOPPLER COMPLETE: CPT

## 2024-12-10 ENCOUNTER — APPOINTMENT (OUTPATIENT)
Dept: CARDIOLOGY | Facility: CLINIC | Age: 39
End: 2024-12-10

## 2024-12-19 ENCOUNTER — APPOINTMENT (OUTPATIENT)
Dept: CARDIOLOGY | Facility: CLINIC | Age: 39
End: 2024-12-19
Payer: COMMERCIAL

## 2024-12-19 VITALS
SYSTOLIC BLOOD PRESSURE: 122 MMHG | HEIGHT: 69 IN | BODY MASS INDEX: 29.18 KG/M2 | WEIGHT: 197 LBS | DIASTOLIC BLOOD PRESSURE: 70 MMHG

## 2024-12-19 DIAGNOSIS — R94.31 ABNORMAL ELECTROCARDIOGRAM [ECG] [EKG]: ICD-10-CM

## 2024-12-19 DIAGNOSIS — E78.5 HYPERLIPIDEMIA, UNSPECIFIED: ICD-10-CM

## 2024-12-19 DIAGNOSIS — I10 ESSENTIAL (PRIMARY) HYPERTENSION: ICD-10-CM

## 2024-12-19 PROCEDURE — G2211 COMPLEX E/M VISIT ADD ON: CPT | Mod: NC

## 2024-12-19 PROCEDURE — 99214 OFFICE O/P EST MOD 30 MIN: CPT

## 2024-12-23 NOTE — ED ADULT NURSE NOTE - SUICIDE SCREENING QUESTION 3
Patient: Bassam Narvaez Date: 2024   : 1950    74 year old male      OUTPATIENT WOUND CARE PROGRESS NOTE    Supervising Wound Care / Hyperbaric Medicine Physician: Dr Jayesh Evans  Consulting Provider:  Jayesh Evans MD  Date of Consultation/Last Comprehensive Exam:  24  Referring  Provider:  [place] POWERS    Chief Complaint: Diabetic foot ulcer    Wound/Ulcer Present:    Diabetic lower extremity ulcer:  Rosenberg grade 1 (superficial diabetic ulcer).  (R/O grade 3)  Diabetic foot exam performed?  Yes.     Current Vascular Assessment:  Physical exam.     Current Antibiotic Regimen:  None.     Current Offloading Modality:  None.    Additional Wound Category:  None     Maximum Baseline Ambulatory Status:  Ambulates unassisted    History of Present Illness:  This is a 74 year old male with a past medical history of type 2 diabetes, hyperlipidemia, hypertension, peripheral neuropathy being seen today for diabetic foot ulcer of the right foot.    Patient reports having an episode of cellulitis back in 2023.  Seen in urgent care and received 7-day course of Keflex.  Symptoms resolved.  Had venous duplex ultrasound which is negative for DVT.    Was seen again on  in urgent care.  Cellulitis was improved however noted he had a hemorrhagic callus.  Received another round of antibiotics and referred to wound care for further evaluation.    Interval History 2024  He currently denies systemic symptoms or expanding redness or warmth  Consuming protein through diet  Currently offloading with routine footwear-will be getting fitted for custom shoes/orthotics later today  Patient is a non-smoker  Patient has a history of controlled diabetes  Per RN, wound measuring similar to prior    Denies any allergies to zinc, iodine, shellfish, silver    Current Treatment Regimen:  Dressing:  See therapy plan   Frequency:  See therapy plan   Changed by:  See therapy plan    Review of  Systems:  Pertinent items are noted in HPI (history of present illness).    Past Medical History:   Diagnosis Date    Benign essential hypertension 5/1/2017    Dyslipidemia 5/23/2018    Hypertriglyceridemia     IFG (impaired fasting glucose) 5/1/2017    Malignant neoplasm of prostate  (CMD)     GG 4+3=7  Dr Huynh    Sleep apnea     does not use c-pap    Small bowel perforation  (CMD)     He was kicked by a heifer and needed exploratory lap and needed bowel resection    Type 2 diabetes mellitus without complication, without long-term current use of insulin  (CMD) 6/4/2019     Past Surgical History:   Procedure Laterality Date    Colonoscopy  12/04/2018    , 10 yr    Extracapsular cataract removal w insert io lens prosth wo ecp  04/25/2012         Extracapsular cataract removal w insert io lens prosth wo ecp Right 08/09/2017    Cataract Removal Lens Implant    Eye surgery      Inguinal hernia repair  9/2010    Lap prostatec retro rad/nerve  10/31/12    Pt2c GG 4+3=7/10 w/  left nerve sparing and right non nerve sparing.     Occult blood test tube  05/01/2012    Other gastro intestinal svcs      kink release X 2.    Prostate surgery      Small intestine surgery  9/2010    SMall bowel resection    Tonsillectomy and adenoidectomy       Social History     Socioeconomic History    Marital status: Single     Spouse name: Not on file    Number of children: 0    Years of education: Not on file    Highest education level: Not on file   Occupational History    Occupation: Not currently employed   Tobacco Use    Smoking status: Never    Smokeless tobacco: Never   Vaping Use    Vaping status: never used   Substance and Sexual Activity    Alcohol use: Yes     Comment: rarely    Drug use: No    Sexual activity: Not Currently   Other Topics Concern    Not on file   Social History Narrative    Not on file     Social Determinants of Health     Financial Resource Strain: Not on file   Food Insecurity: Not on file    Transportation Needs: Not on file   Physical Activity: Not on file   Stress: Not on file   Social Connections: Not on file   Interpersonal Safety: Not At Risk (4/23/2021)    Interpersonal Safety     Social Determinants: Intimate Partner Violence Past Fear: Not on file     Social Determinants: Intimate Partner Violence Current Fear: Not on file     Family History   Problem Relation Age of Onset    Cataracts Mother     Hypertension Mother     Osteoarthritis Mother     Stroke Father         X 2    Cataracts Father     Diabetes Father     Heart disease Father     Kidney disease Father     Hypertension Father     Cancer, Prostate Father     Hypertension Sister     Hypertension Brother        Current Outpatient Medications   Medication Sig    blood glucose meter Use to test blood sugar 1 time daily.    blood glucose test strip Test blood sugar 1 time daily.    blood glucose lancets Use to test blood sugar 1 time daily.    Lancet Devices (Lancing Device) Misc Use to test blood sugar 1 time daily.    sharps container Discard Sharps safely    atorvastatin (LIPITOR) 10 MG tablet Take 1 tablet by mouth daily.    losartan (COZAAR) 25 MG tablet Take 1 tablet by mouth in the morning and 1 tablet in the evening.    metFORMIN (GLUCOPHAGE-XR) 500 MG 24 hr tablet Take 1 tablet by mouth in the morning and 1 tablet in the evening. Take with meals.    glipiZIDE (GLUCOTROL XL) 5 MG 24 hr tablet Take 1 tablet by mouth daily.    Omega-3 Fatty Acids (Fish Oil) 1000 MG capsule Take 2 g by mouth daily.    Vitamin Mixture (CONNIE-C PO) Take by mouth as needed (when he feels a cold coming on).    Calcium Carb-Cholecalciferol (ALL DAY CALCIUM PO) Take by mouth daily.    Multiple Vitamins-Minerals (MULTIVITAMIN ADULT PO) Take 1 tablet by mouth.     Current Facility-Administered Medications   Medication    lidocaine 2% urethral (UROJET) 2 % jelly 10 mL        ALLERGIES:  Patient has no known allergies.    OBJECTIVE:  Vital Signs:  Visit  Vitals  /77 (BP Location: LUE - Left upper extremity, Patient Position: Sitting)   Pulse 76   Temp 97.5 °F (36.4 °C) (Temporal)   Resp 16   SpO2 98%         Physical Exam:  General appearance: Alert and cooperative  Head:   normocephalic without obvious abnormality  Eye:  Conjunctivae/sclerae normal. No erythema, edema or exudate.  Pulmonary: normal respiratory effort    Biphasic DP/PT pulse bilateral lower extremities    Right foot with full-thickness plantar ulcer beneath second metatarsal head  Wound bed granular  Periwound with callus and without erythema or warmth  Serous drainage        Postdebridement photo not available at time of note writing however full-thickness ulcer was present after debridement.  No exposed bone    Wound Bed Quality:  See above      Jaycee-wound Quality:    See above    Additional Descriptors:  See above    Wound Measurements Per Flowsheet:    Wound Eye Right Surgical incision (Active)   Number of days: 2693     Wound Foot Right Plantar (Active)   Wound Care Team Consult Date 11/20/24 11/20/24 0808   Wound Length (cm) 0.6 cm 12/23/24 0758   Wound Width (cm) 0.2 cm 12/23/24 0758   Wound Depth (cm) 0.6 cm 12/23/24 0758   Wound Surface Area (cm^2) 0.12 cm^2 12/23/24 0758   Wound Volume (cm^3) 0.072 cm^3 12/23/24 0758   Wound Volume Change (Initial) -0.2 cm3 12/23/24 0758   Wound Volume % Change (Initial) -73.82 % 12/23/24 0758   Number of days: 33     PROCEDURE:  Debridement: Selective Non-Excisional Debridgement of Non-viable Tissue  12/23/24 8:21 AM  Wound Location: R foot  Informed consent obtained.  Time out was completed.  Patient, procedure, and site verified.  Anesthesia-  Topical  Size-  Less than or equal to 20 sq cm  Total debrided area was <2 sq cm    Instrument-  Curette  Non-viable tissue removed-  Fibrin/Slough and Epidermis/dermis  Hemostasis achieved-  Pressure  Patient procedure was-  tolerated well, no complications.  Refer to nursing notes for wound dimensions and  assessment.  Patient stable upon completion of procedure.  Wound dimensions unchanged post procedure.    Procedure was Performed by:  Physician     Laboratory assessments reviewed:  No results found for: \"PAB\"   Albumin (g/dL)   Date Value   2024 4.1   2023 3.8   06/10/2022 4.0      No results available in last 24 hours    Lab Results   Component Value Date    WBC 11.3 (H) 10/23/2024    GLUCOSE 287 (H) 2024    HGBA1C 7.5 (H) 2024    CREATININE 1.21 (H) 2024    GFRA 76 2019    GFRNA 66 2019        Culture results:  No results found for: \"SDES\" No results found for: \"CULT\"     Diagnostic Assessments Reviewed:  X -ray (s)     Nutritional Assessment:  Prealbumin and/or Albumin reviewed    Wound treatment goals are palliative:  No    DIAGNOSES:  Diabetic foot ulcer, right midfoot, suspect Rosenberg grade 1 however rule out Rosenberg grade 3  Diabetic peripheral neuropathy  Recent cellulitis that appears resolved    Medical Decision Makin/23/24: Patient seen today for outpatient follow-up visit.  Tolerated debridement.    Wound is unfortunately stalled however healthy appearing.  Suspect this is secondary to foot deformity/chronic dislocations.    Current plan is conservative management after discussion with surgical podiatry.  He will be receiving custom shoes/orthotics today.  In the meantime, minimize ambulation best as able    This continues to be a high risk/challenging wound.  We could consider total contact casting as well if he does not continue to improve with custom shoes/orthotics.    Wound care recommendations below.  Monitor for signs of infection.  Follow-up in 3 weeks    Topical care:  Wash area with soap and water  Pat area dry  Silver collagen  Dry cover dressing  Change daily    Offloading:  Custom orthotic  Patient will be fitted for custom shoes/inserts today  Minimize ambulation    Edema:   NA    Infection:  No acute signs of infection based on today's  examination     Endocrine:    Last HgbA1c was   Hemoglobin A1C (%)   Date Value   07/09/2024 7.5 (H)   Goal for glucose is less than 150 at all times and a HgbA1c of less than 7.4 %    Imaging & Vascular  11/20/24: Biphasic DP/PT pulse bilaterally  11/20/24: X-ray of foot negative for osteomyelitis with multiple dislocations    Nutrition:    Consume protein supplement/shakes daily    Tobacco:   This patient is a non-smoker    Hyperbaric:    Not indicated at this time    Consults  None    Follow Up  3 weeks      Plan of Care:  Advanced Wound Care Recommendations:  N/A  Percent Wound Closure from consult:  N/A  Care plan to augment wound closure:  Not applicable.  N/A    Jayesh Evans MD  [facility] Wound care/Hyperbaric Provider       No

## 2025-01-21 NOTE — ED CDU PROVIDER DISPOSITION NOTE - PATIENT PORTAL LINK FT
Pt called and left VM requesting a call back to schedule her port removal.   
You can access the FollowMyHealth Patient Portal offered by [facility] by registering at the following website: [email or web address]. By joining [employer]’s FollowMyHealth portal, you will also be able to view your health information using other applications (apps) compatible with our system.